# Patient Record
Sex: FEMALE | Race: WHITE | NOT HISPANIC OR LATINO | Employment: FULL TIME | URBAN - METROPOLITAN AREA
[De-identification: names, ages, dates, MRNs, and addresses within clinical notes are randomized per-mention and may not be internally consistent; named-entity substitution may affect disease eponyms.]

---

## 2023-04-19 ENCOUNTER — OFFICE VISIT (OUTPATIENT)
Dept: PRIMARY CARE | Facility: CLINIC | Age: 38
End: 2023-04-19
Payer: COMMERCIAL

## 2023-04-19 VITALS
HEIGHT: 65 IN | DIASTOLIC BLOOD PRESSURE: 82 MMHG | WEIGHT: 236 LBS | SYSTOLIC BLOOD PRESSURE: 120 MMHG | BODY MASS INDEX: 39.32 KG/M2 | TEMPERATURE: 98 F

## 2023-04-19 DIAGNOSIS — F41.9 ANXIETY AND DEPRESSION: ICD-10-CM

## 2023-04-19 DIAGNOSIS — F41.8 SITUATIONAL ANXIETY: Primary | ICD-10-CM

## 2023-04-19 DIAGNOSIS — F32.A ANXIETY AND DEPRESSION: ICD-10-CM

## 2023-04-19 PROBLEM — L03.90 CELLULITIS: Status: ACTIVE | Noted: 2023-04-19

## 2023-04-19 PROBLEM — R53.83 MALAISE AND FATIGUE: Status: ACTIVE | Noted: 2023-04-19

## 2023-04-19 PROBLEM — G93.2 PSEUDOTUMOR CEREBRI: Status: ACTIVE | Noted: 2023-04-19

## 2023-04-19 PROBLEM — R53.81 MALAISE AND FATIGUE: Status: ACTIVE | Noted: 2023-04-19

## 2023-04-19 PROBLEM — R79.89 ABNORMAL CBC: Status: ACTIVE | Noted: 2023-04-19

## 2023-04-19 PROBLEM — R73.03 PREDIABETES: Status: ACTIVE | Noted: 2023-04-19

## 2023-04-19 PROBLEM — Z97.5 IUD CONTRACEPTION: Status: ACTIVE | Noted: 2023-04-19

## 2023-04-19 PROCEDURE — 99214 OFFICE O/P EST MOD 30 MIN: CPT | Performed by: NURSE PRACTITIONER

## 2023-04-19 RX ORDER — NORETHINDRONE 0.35 MG/1
1 TABLET ORAL DAILY
COMMUNITY
Start: 2023-03-06 | End: 2023-04-19 | Stop reason: ALTCHOICE

## 2023-04-19 RX ORDER — ERYTHROMYCIN 5 MG/G
OINTMENT OPHTHALMIC
COMMUNITY
End: 2023-04-19 | Stop reason: ALTCHOICE

## 2023-04-19 RX ORDER — BUSPIRONE HYDROCHLORIDE 7.5 MG/1
7.5 TABLET ORAL 2 TIMES DAILY
Qty: 180 TABLET | Refills: 1 | Status: SHIPPED | OUTPATIENT
Start: 2023-04-19 | End: 2023-06-27 | Stop reason: SDUPTHER

## 2023-04-19 RX ORDER — ESCITALOPRAM OXALATE 20 MG/1
20 TABLET ORAL DAILY
Qty: 90 TABLET | Refills: 1 | Status: SHIPPED | OUTPATIENT
Start: 2023-04-19 | End: 2023-09-29 | Stop reason: SDUPTHER

## 2023-04-19 RX ORDER — BUSPIRONE HYDROCHLORIDE 5 MG/1
5 TABLET ORAL
COMMUNITY
End: 2023-04-19 | Stop reason: ALTCHOICE

## 2023-04-19 RX ORDER — BUPROPION HYDROCHLORIDE 150 MG/1
150 TABLET ORAL DAILY
COMMUNITY
End: 2023-04-19 | Stop reason: SDUPTHER

## 2023-04-19 RX ORDER — FLUTICASONE PROPIONATE 50 MCG
1 SPRAY, SUSPENSION (ML) NASAL 2 TIMES DAILY
COMMUNITY
Start: 2022-06-08 | End: 2023-09-29 | Stop reason: ENTERED-IN-ERROR

## 2023-04-19 RX ORDER — ESCITALOPRAM OXALATE 20 MG/1
20 TABLET ORAL DAILY
COMMUNITY
End: 2023-04-19 | Stop reason: SDUPTHER

## 2023-04-19 RX ORDER — BUPROPION HYDROCHLORIDE 150 MG/1
150 TABLET ORAL DAILY
Qty: 90 TABLET | Refills: 1 | Status: SHIPPED | OUTPATIENT
Start: 2023-04-19 | End: 2023-06-23 | Stop reason: SDUPTHER

## 2023-04-19 ASSESSMENT — PATIENT HEALTH QUESTIONNAIRE - PHQ9
SUM OF ALL RESPONSES TO PHQ9 QUESTIONS 1 AND 2: 1
2. FEELING DOWN, DEPRESSED OR HOPELESS: NOT AT ALL
1. LITTLE INTEREST OR PLEASURE IN DOING THINGS: SEVERAL DAYS

## 2023-04-19 NOTE — PROGRESS NOTES
"Subjective   Patient ID: Kristina Cortez is a 37 y.o. female who presents for Follow-up.    HPI   Takes Buspar twice daily  Wonders about a higher dose of this.  Feeling like it does not work as well as it did initially.  Taking Bupropion and Escitalopram regularly  Going through a tough time.  GYN  - due to go.    Review of Systems   All other systems reviewed and are negative.        Objective   /82   Temp 36.7 °C (98 °F)   Ht 1.651 m (5' 5\")   Wt 107 kg (236 lb)   BMI 39.27 kg/m²     Physical Exam  Vitals and nursing note reviewed.   Constitutional:       Appearance: Normal appearance. She is obese.   HENT:      Head: Normocephalic and atraumatic.      Right Ear: Tympanic membrane and ear canal normal.      Left Ear: Tympanic membrane and ear canal normal.   Neck:      Thyroid: No thyroid mass, thyromegaly or thyroid tenderness.   Cardiovascular:      Rate and Rhythm: Normal rate and regular rhythm.      Pulses: Normal pulses.      Heart sounds: Normal heart sounds.   Pulmonary:      Effort: Pulmonary effort is normal.      Breath sounds: Normal breath sounds.   Abdominal:      General: Bowel sounds are normal.      Palpations: Abdomen is soft.   Musculoskeletal:      Cervical back: Normal range of motion and neck supple.   Lymphadenopathy:      Cervical: No cervical adenopathy.   Neurological:      Mental Status: She is alert and oriented to person, place, and time. Mental status is at baseline.   Psychiatric:         Mood and Affect: Mood normal.         Behavior: Behavior normal.      Comments: Good eye contact, pleasant           Assessment/Plan   Problem List Items Addressed This Visit          Other    Anxiety and depression     Continue with current medications         Relevant Medications    escitalopram (Lexapro) 20 mg tablet    buPROPion XL (Wellbutrin XL) 150 mg 24 hr tablet    Situational anxiety - Primary     Bupropion PRN at increased dose         Relevant Medications    busPIRone " (Buspar) 7.5 mg tablet

## 2023-04-19 NOTE — PATIENT INSTRUCTIONS
Good to see you today.  Buspirone increased to 7.5 mg - you can take 1/2 to 1 to 2 per day as needed for situational anxiety  Continue with the Bupropion and Escitalopram  Let me know how you are doing on the new dose.

## 2023-04-30 PROBLEM — L03.90 CELLULITIS: Status: RESOLVED | Noted: 2023-04-19 | Resolved: 2023-04-30

## 2023-04-30 PROBLEM — F41.9 ANXIETY AND DEPRESSION: Status: ACTIVE | Noted: 2023-04-19

## 2023-04-30 PROBLEM — F32.A ANXIETY AND DEPRESSION: Status: ACTIVE | Noted: 2023-04-19

## 2023-05-12 ENCOUNTER — OFFICE VISIT (OUTPATIENT)
Dept: PRIMARY CARE | Facility: CLINIC | Age: 38
End: 2023-05-12
Payer: COMMERCIAL

## 2023-05-12 VITALS
DIASTOLIC BLOOD PRESSURE: 82 MMHG | TEMPERATURE: 97.9 F | WEIGHT: 233 LBS | SYSTOLIC BLOOD PRESSURE: 122 MMHG | BODY MASS INDEX: 38.77 KG/M2

## 2023-05-12 DIAGNOSIS — F32.A ANXIETY AND DEPRESSION: ICD-10-CM

## 2023-05-12 DIAGNOSIS — F41.9 ANXIETY AND DEPRESSION: ICD-10-CM

## 2023-05-12 DIAGNOSIS — F41.8 SITUATIONAL ANXIETY: Primary | ICD-10-CM

## 2023-05-12 PROCEDURE — 99214 OFFICE O/P EST MOD 30 MIN: CPT | Performed by: NURSE PRACTITIONER

## 2023-05-12 NOTE — PATIENT INSTRUCTIONS
Good to see you today.    FMLA forms completed.  Continue with current medications.  Call for counseling.  Let me know if you need anything.

## 2023-05-12 NOTE — PROGRESS NOTES
Subjective   Patient ID: Kristina Cortez is a 37 y.o. female who presents for Forms/questionnaires (FMLA forms that need filled out).    HPI   Living situation with spouse and new Significant Other in their home has been more overwhelming.  Went to her mom's for a break and to re-group & clear her head.  Did not work during this time due to feeling it was difficult to function, including work.  Was off work from [employer] April 24- 5/6  Off 5/7 and RTW 5/8 with no restrictions.  Has been there 15 years    Review of Systems   All other systems reviewed and are negative.        Objective   /82   Temp 36.6 °C (97.9 °F)   Wt 106 kg (233 lb)   BMI 38.77 kg/m²     Physical Exam  Vitals and nursing note reviewed.   Constitutional:       Appearance: Normal appearance.   HENT:      Head: Normocephalic and atraumatic.      Nose: Nose normal.      Mouth/Throat:      Pharynx: Oropharynx is clear.   Eyes:      Conjunctiva/sclera: Conjunctivae normal.      Pupils: Pupils are equal, round, and reactive to light.   Cardiovascular:      Rate and Rhythm: Normal rate and regular rhythm.      Pulses: Normal pulses.      Heart sounds: Normal heart sounds.   Pulmonary:      Effort: Pulmonary effort is normal.      Breath sounds: Normal breath sounds.   Musculoskeletal:      Cervical back: Normal range of motion and neck supple.   Neurological:      Mental Status: She is alert and oriented to person, place, and time. Mental status is at baseline.   Psychiatric:         Mood and Affect: Mood normal.         Behavior: Behavior normal.         Thought Content: Thought content normal.      Comments: Good eye contact, pleasant         Assessment/Plan   Problem List Items Addressed This Visit          Other    Anxiety and depression    Situational anxiety - Primary   Continue with current Rx

## 2023-05-17 ENCOUNTER — TELEPHONE (OUTPATIENT)
Dept: PRIMARY CARE | Facility: CLINIC | Age: 38
End: 2023-05-17
Payer: COMMERCIAL

## 2023-05-17 NOTE — TELEPHONE ENCOUNTER
Chiara a nurse with [employer] received patients disability paperwork and had a few questions. Any abnormal mental status since last visit, change in treatment plan or any additional dates of treament after 4/24/23    Chiara  [phone]

## 2023-06-23 ENCOUNTER — OFFICE VISIT (OUTPATIENT)
Dept: PRIMARY CARE | Facility: CLINIC | Age: 38
End: 2023-06-23
Payer: COMMERCIAL

## 2023-06-23 VITALS
TEMPERATURE: 97.6 F | WEIGHT: 235 LBS | SYSTOLIC BLOOD PRESSURE: 148 MMHG | DIASTOLIC BLOOD PRESSURE: 82 MMHG | BODY MASS INDEX: 39.11 KG/M2

## 2023-06-23 DIAGNOSIS — F41.9 ANXIETY AND DEPRESSION: Primary | ICD-10-CM

## 2023-06-23 DIAGNOSIS — F41.8 SITUATIONAL ANXIETY: ICD-10-CM

## 2023-06-23 DIAGNOSIS — F32.A ANXIETY AND DEPRESSION: Primary | ICD-10-CM

## 2023-06-23 PROCEDURE — 99214 OFFICE O/P EST MOD 30 MIN: CPT | Performed by: NURSE PRACTITIONER

## 2023-06-23 RX ORDER — BUPROPION HYDROCHLORIDE 150 MG/1
300 TABLET ORAL DAILY
Qty: 90 TABLET | Refills: 1 | Status: SHIPPED | OUTPATIENT
Start: 2023-06-23 | End: 2023-09-29 | Stop reason: SDUPTHER
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
## 2023-06-23 NOTE — PATIENT INSTRUCTIONS
Good to see you today   Increase the Bupropion to 300 mg (you can take 2 @ 150 mg once daily until gone)  Then the new Rx is one per day.  For Counseling, check out the website for  [facility] - they have a [place] location  [phone] [id number]    ZOEY Robertson  Plan to follow up in 3 months   Yes

## 2023-06-27 RX ORDER — BUSPIRONE HYDROCHLORIDE 15 MG/1
TABLET ORAL
Qty: 270 TABLET | Refills: 1 | Status: SHIPPED | OUTPATIENT
Start: 2023-06-27 | End: 2023-09-29 | Stop reason: SDUPTHER

## 2023-09-20 ENCOUNTER — APPOINTMENT (OUTPATIENT)
Dept: PRIMARY CARE | Facility: CLINIC | Age: 38
End: 2023-09-20
Payer: COMMERCIAL

## 2023-09-29 ENCOUNTER — OFFICE VISIT (OUTPATIENT)
Dept: PRIMARY CARE | Facility: CLINIC | Age: 38
End: 2023-09-29
Payer: COMMERCIAL

## 2023-09-29 VITALS
TEMPERATURE: 97.9 F | DIASTOLIC BLOOD PRESSURE: 84 MMHG | WEIGHT: 231 LBS | BODY MASS INDEX: 38.44 KG/M2 | SYSTOLIC BLOOD PRESSURE: 132 MMHG

## 2023-09-29 DIAGNOSIS — F32.A ANXIETY AND DEPRESSION: ICD-10-CM

## 2023-09-29 DIAGNOSIS — F41.9 ANXIETY AND DEPRESSION: ICD-10-CM

## 2023-09-29 DIAGNOSIS — F41.8 SITUATIONAL ANXIETY: ICD-10-CM

## 2023-09-29 PROCEDURE — 99214 OFFICE O/P EST MOD 30 MIN: CPT | Performed by: NURSE PRACTITIONER

## 2023-09-29 RX ORDER — BUSPIRONE HYDROCHLORIDE 15 MG/1
TABLET ORAL
Qty: 270 TABLET | Refills: 1 | Status: SHIPPED | OUTPATIENT
Start: 2023-09-29 | End: 2024-05-17 | Stop reason: SDUPTHER

## 2023-09-29 RX ORDER — BUPROPION HYDROCHLORIDE 300 MG/1
300 TABLET ORAL DAILY
Qty: 90 TABLET | Refills: 1 | Status: SHIPPED | OUTPATIENT
Start: 2023-09-29 | End: 2024-05-17 | Stop reason: SDUPTHER

## 2023-09-29 RX ORDER — ESCITALOPRAM OXALATE 20 MG/1
20 TABLET ORAL DAILY
Qty: 90 TABLET | Refills: 1 | Status: SHIPPED | OUTPATIENT
Start: 2023-09-29 | End: 2024-05-17 | Stop reason: SDUPTHER

## 2023-09-29 NOTE — PATIENT INSTRUCTIONS
Good to see you today.  Refill of medication.   Get back to counseling when you can.  Work on a better routine which gives you a little time to take care of yourself too.  Plan to follow up in 6 months.
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
## 2024-05-17 ENCOUNTER — OFFICE VISIT (OUTPATIENT)
Dept: PRIMARY CARE | Facility: CLINIC | Age: 39
End: 2024-05-17
Payer: MEDICAID

## 2024-05-17 VITALS
TEMPERATURE: 97.9 F | SYSTOLIC BLOOD PRESSURE: 144 MMHG | HEIGHT: 65 IN | BODY MASS INDEX: 32.15 KG/M2 | WEIGHT: 193 LBS | DIASTOLIC BLOOD PRESSURE: 88 MMHG

## 2024-05-17 DIAGNOSIS — F41.8 SITUATIONAL ANXIETY: ICD-10-CM

## 2024-05-17 DIAGNOSIS — F32.A ANXIETY AND DEPRESSION: ICD-10-CM

## 2024-05-17 DIAGNOSIS — F41.9 ANXIETY AND DEPRESSION: ICD-10-CM

## 2024-05-17 PROCEDURE — 4004F PT TOBACCO SCREEN RCVD TLK: CPT | Performed by: NURSE PRACTITIONER

## 2024-05-17 PROCEDURE — 99214 OFFICE O/P EST MOD 30 MIN: CPT | Performed by: NURSE PRACTITIONER

## 2024-05-17 RX ORDER — BUPROPION HYDROCHLORIDE 300 MG/1
300 TABLET ORAL DAILY
Qty: 90 TABLET | Refills: 3 | Status: SHIPPED | OUTPATIENT
Start: 2024-05-17 | End: 2024-05-20 | Stop reason: SDUPTHER

## 2024-05-17 RX ORDER — ESCITALOPRAM OXALATE 20 MG/1
20 TABLET ORAL DAILY
Qty: 90 TABLET | Refills: 3 | Status: SHIPPED | OUTPATIENT
Start: 2024-05-17 | End: 2024-05-20 | Stop reason: SDUPTHER

## 2024-05-17 RX ORDER — BUSPIRONE HYDROCHLORIDE 15 MG/1
TABLET ORAL
Qty: 270 TABLET | Refills: 3 | Status: SHIPPED | OUTPATIENT
Start: 2024-05-17 | End: 2024-05-20 | Stop reason: SDUPTHER

## 2024-05-17 ASSESSMENT — PATIENT HEALTH QUESTIONNAIRE - PHQ9
1. LITTLE INTEREST OR PLEASURE IN DOING THINGS: NOT AT ALL
SUM OF ALL RESPONSES TO PHQ9 QUESTIONS 1 AND 2: 0
2. FEELING DOWN, DEPRESSED OR HOPELESS: NOT AT ALL

## 2024-05-17 NOTE — PROGRESS NOTES
"Subjective   Patient ID: Kristina Cortez is a 38 y.o. female who presents for Follow-up (Med refill).    HPI   Has been at Mom's for a year   Spending days trying to find job.  Taking care of parents.  Dad is retiring.    Walking for activity.  No longer stress eating  Smoking 5 per day  Takes Benadryl to sleep  Dtgr stresses her out.    Review of Systems   Psychiatric/Behavioral:  Positive for dysphoric mood and sleep disturbance. The patient is nervous/anxious.    All other systems reviewed and are negative.      Objective   /88   Temp 36.6 °C (97.9 °F)   Ht 1.651 m (5' 5\")   Wt 87.5 kg (193 lb)   BMI 32.12 kg/m²     Physical Exam  Vitals and nursing note reviewed.   Constitutional:       Appearance: Normal appearance.   HENT:      Head: Normocephalic and atraumatic.      Right Ear: Tympanic membrane, ear canal and external ear normal.      Left Ear: Tympanic membrane, ear canal and external ear normal.      Mouth/Throat:      Pharynx: Oropharynx is clear.   Neck:      Thyroid: No thyroid mass, thyromegaly or thyroid tenderness.   Cardiovascular:      Rate and Rhythm: Normal rate and regular rhythm.      Pulses: Normal pulses.      Heart sounds: Normal heart sounds.   Pulmonary:      Effort: Pulmonary effort is normal.      Breath sounds: Normal breath sounds.   Abdominal:      General: Bowel sounds are normal.      Palpations: Abdomen is soft.   Neurological:      Mental Status: She is alert and oriented to person, place, and time.   Psychiatric:         Mood and Affect: Mood normal.         Behavior: Behavior normal.         Thought Content: Thought content normal.         Judgment: Judgment normal.         Assessment/Plan   Problem List Items Addressed This Visit             ICD-10-CM    Anxiety and depression F41.9, F32.A     Continue with current medication. Again encouraged counseling.          Relevant Medications    buPROPion XL (Wellbutrin XL) 300 mg 24 hr tablet    escitalopram (Lexapro) 20 mg " tablet    Situational anxiety F41.8     Buspirone PRN         Relevant Medications    busPIRone (Buspar) 15 mg tablet

## 2024-05-20 RX ORDER — BUPROPION HYDROCHLORIDE 300 MG/1
300 TABLET ORAL DAILY
Qty: 30 TABLET | Refills: 11 | Status: SHIPPED | OUTPATIENT
Start: 2024-05-20

## 2024-05-20 RX ORDER — BUSPIRONE HYDROCHLORIDE 15 MG/1
TABLET ORAL
Qty: 60 TABLET | Refills: 11 | Status: SHIPPED | OUTPATIENT
Start: 2024-05-20

## 2024-05-20 RX ORDER — ESCITALOPRAM OXALATE 20 MG/1
20 TABLET ORAL DAILY
Qty: 30 TABLET | Refills: 11 | Status: SHIPPED | OUTPATIENT
Start: 2024-05-20

## 2024-06-11 ASSESSMENT — ENCOUNTER SYMPTOMS
DYSPHORIC MOOD: 1
NERVOUS/ANXIOUS: 1
SLEEP DISTURBANCE: 1

## 2025-03-11 ENCOUNTER — APPOINTMENT (OUTPATIENT)
Dept: NEUROLOGY | Facility: HOSPITAL | Age: 40
End: 2025-03-11
Payer: COMMERCIAL

## 2025-05-22 ENCOUNTER — OFFICE VISIT (OUTPATIENT)
Dept: URGENT CARE | Age: 40
End: 2025-05-22
Payer: MEDICAID

## 2025-05-22 VITALS
HEART RATE: 95 BPM | SYSTOLIC BLOOD PRESSURE: 121 MMHG | DIASTOLIC BLOOD PRESSURE: 89 MMHG | OXYGEN SATURATION: 97 % | TEMPERATURE: 98.2 F

## 2025-05-22 DIAGNOSIS — R30.0 DYSURIA: ICD-10-CM

## 2025-05-22 DIAGNOSIS — N30.01 ACUTE CYSTITIS WITH HEMATURIA: Primary | ICD-10-CM

## 2025-05-22 LAB
POC APPEARANCE, URINE: ABNORMAL
POC BILIRUBIN, URINE: ABNORMAL
POC BLOOD, URINE: ABNORMAL
POC COLOR, URINE: ABNORMAL
POC GLUCOSE, URINE: NEGATIVE MG/DL
POC KETONES, URINE: NEGATIVE MG/DL
POC LEUKOCYTES, URINE: ABNORMAL
POC NITRITE,URINE: NEGATIVE
POC PH, URINE: 6 PH
POC PROTEIN, URINE: ABNORMAL MG/DL
POC SPECIFIC GRAVITY, URINE: >=1.03
POC UROBILINOGEN, URINE: 0.2 EU/DL
PREGNANCY TEST URINE, POC: NEGATIVE

## 2025-05-22 RX ORDER — NITROFURANTOIN 25; 75 MG/1; MG/1
100 CAPSULE ORAL 2 TIMES DAILY
Qty: 14 CAPSULE | Refills: 0 | Status: SHIPPED | OUTPATIENT
Start: 2025-05-22 | End: 2025-05-29

## 2025-05-22 ASSESSMENT — ENCOUNTER SYMPTOMS
BACK PAIN: 0
FREQUENCY: 1
DIARRHEA: 0
FLANK PAIN: 0
FEVER: 0
CHILLS: 0
ABDOMINAL DISTENTION: 0
VOMITING: 0
DYSURIA: 1
NAUSEA: 0
HEMATURIA: 0
SHORTNESS OF BREATH: 0

## 2025-05-22 NOTE — PATIENT INSTRUCTIONS
If you develop nausea, vomiting, back pain and fever go to ER    Drink plenty of fluids    Results will be in mychart from culture  We will call if any change in antibiotic is needed    Follow up with pcp.

## 2025-05-22 NOTE — PROGRESS NOTES
Subjective   Patient ID: Kristina Cortez is a 39 y.o. female. They present today with a chief complaint of Difficulty Urinating (X 1 week ).    History of Present Illness  Patient presents for one week of urinary urgency, frequency, and burning with urination. Denies hematuria. Denies back pain, flank pain. Denies fever, chills, sweats. Denies n/v/d. Denies chest pain, sob.         Difficulty Urinating  Associated symptoms: no fever, no flank pain, no nausea, no vaginal discharge and no vomiting        Past Medical History  Allergies as of 05/22/2025 - Reviewed 05/22/2025   Allergen Reaction Noted    Sulfa (sulfonamide antibiotics) Unknown 12/10/2017    Sulfamethoxazole Unknown 04/19/2023    Sulfa dyne Rash 10/05/2012       Prescriptions Prior to Admission[1]     Medical History[2]    Surgical History[3]     reports that she has been smoking cigarettes. She has never used smokeless tobacco.    Review of Systems  Review of Systems   Constitutional:  Negative for chills and fever.   Respiratory:  Negative for shortness of breath.    Cardiovascular:  Negative for chest pain.   Gastrointestinal:  Negative for abdominal distention, diarrhea, nausea and vomiting.   Genitourinary:  Positive for dysuria, frequency and urgency. Negative for flank pain, hematuria, vaginal bleeding, vaginal discharge and vaginal pain.   Musculoskeletal:  Negative for back pain.                                  Objective    Vitals:    05/22/25 1620   BP: 121/89   Pulse: 95   Temp: 36.8 °C (98.2 °F)   SpO2: 97%     Patient's last menstrual period was 05/08/2025 (approximate).    Physical Exam  Constitutional:       General: She is not in acute distress.     Appearance: She is not toxic-appearing.   HENT:      Head: Normocephalic and atraumatic.   Pulmonary:      Effort: Pulmonary effort is normal. No respiratory distress.      Breath sounds: Normal breath sounds. No wheezing.   Abdominal:      General: Abdomen is flat.      Palpations: Abdomen  is soft.      Tenderness: There is no abdominal tenderness. There is no right CVA tenderness, left CVA tenderness, guarding or rebound.   Musculoskeletal:      Cervical back: Normal range of motion.   Neurological:      Mental Status: She is alert.         Procedures    Point of Care Test & Imaging Results from this visit  Results for orders placed or performed in visit on 05/22/25   POCT UA Automated manually resulted   Result Value Ref Range    POC Color, Urine Red-brown (A) Straw, Yellow, Light-Yellow    POC Appearance, Urine Turbid (A) Clear    POC Glucose, Urine NEGATIVE NEGATIVE mg/dl    POC Bilirubin, Urine SMALL (1+) (A) NEGATIVE    POC Ketones, Urine NEGATIVE NEGATIVE mg/dl    POC Specific Gravity, Urine >=1.030 1.005 - 1.035    POC Blood, Urine LARGE (3+) (A) NEGATIVE    POC PH, Urine 6.0 No Reference Range Established PH    POC Protein, Urine TRACE (A) NEGATIVE mg/dl    POC Urobilinogen, Urine 0.2 0.2, 1.0 EU/DL    Poc Nitrite, Urine NEGATIVE NEGATIVE    POC Leukocytes, Urine MODERATE (2+) (A) NEGATIVE   POCT pregnancy, urine manually resulted   Result Value Ref Range    Preg Test, Ur Negative Negative      Imaging  No results found.    Cardiology, Vascular, and Other Imaging  No other imaging results found for the past 2 days      Diagnostic study results (if any) were reviewed by Kristina Huffman PA-C.    Assessment/Plan   Allergies, medications, history, and pertinent labs/EKGs/Imaging reviewed by Kristina Huffman PA-C.     Medical Decision Making  MDM- History, examination, and urine dipstick, positive leuks and blood, result are consistent with UTI without evidence of pyelonephritis or sepsis. Patient will be given antibiotic therapy and cultures pending. Supportive measures. Return to clinic or present to ED if symptoms change or worsen. Otherwise follow with PCP. Patient verbalized understanding and agrees with plan.       Orders and Diagnoses  Diagnoses and all orders for this visit:  Acute  cystitis with hematuria  -     nitrofurantoin, macrocrystal-monohydrate, (Macrobid) 100 mg capsule; Take 1 capsule (100 mg) by mouth 2 times a day for 7 days.  Dysuria  -     POCT UA Automated manually resulted  -     POCT pregnancy, urine manually resulted  -     Urine Culture      Medical Admin Record      Patient disposition: Home    Electronically signed by Kristina Huffman PA-C  4:25 PM           [1] (Not in a hospital admission)   [2]   Past Medical History:  Diagnosis Date    Other specified health status     No pertinent past medical history   [3]   Past Surgical History:  Procedure Laterality Date    OTHER SURGICAL HISTORY  06/19/2020    Tonsillectomy

## 2025-05-23 LAB — BACTERIA UR CULT: NORMAL

## 2025-05-30 DIAGNOSIS — F41.9 ANXIETY AND DEPRESSION: ICD-10-CM

## 2025-05-30 DIAGNOSIS — F32.A ANXIETY AND DEPRESSION: ICD-10-CM

## 2025-06-03 RX ORDER — BUPROPION HYDROCHLORIDE 300 MG/1
300 TABLET ORAL DAILY
Qty: 30 TABLET | Refills: 0 | Status: SHIPPED | OUTPATIENT
Start: 2025-06-03

## 2025-06-11 DIAGNOSIS — F32.A ANXIETY AND DEPRESSION: ICD-10-CM

## 2025-06-11 DIAGNOSIS — F41.8 SITUATIONAL ANXIETY: ICD-10-CM

## 2025-06-11 DIAGNOSIS — F41.9 ANXIETY AND DEPRESSION: ICD-10-CM

## 2025-06-11 RX ORDER — BUSPIRONE HYDROCHLORIDE 15 MG/1
15 TABLET ORAL 3 TIMES DAILY PRN
Qty: 270 TABLET | Refills: 0 | Status: SHIPPED | OUTPATIENT
Start: 2025-06-11

## 2025-06-11 RX ORDER — BUPROPION HYDROCHLORIDE 300 MG/1
300 TABLET ORAL DAILY
Qty: 90 TABLET | Refills: 0 | Status: SHIPPED | OUTPATIENT
Start: 2025-06-11

## 2025-06-11 RX ORDER — NORETHINDRONE 0.35 MG/1
1 TABLET ORAL
COMMUNITY
Start: 2025-04-02

## 2025-06-11 RX ORDER — ESCITALOPRAM OXALATE 20 MG/1
20 TABLET ORAL DAILY
Qty: 90 TABLET | Refills: 0 | Status: SHIPPED | OUTPATIENT
Start: 2025-06-11

## 2025-06-25 ENCOUNTER — APPOINTMENT (OUTPATIENT)
Dept: PRIMARY CARE | Facility: CLINIC | Age: 40
End: 2025-06-25
Payer: MEDICAID

## 2025-06-25 VITALS
BODY MASS INDEX: 31.65 KG/M2 | WEIGHT: 190 LBS | TEMPERATURE: 97.7 F | DIASTOLIC BLOOD PRESSURE: 82 MMHG | SYSTOLIC BLOOD PRESSURE: 122 MMHG | HEIGHT: 65 IN

## 2025-06-25 DIAGNOSIS — Z00.00 ROUTINE GENERAL MEDICAL EXAMINATION AT A HEALTH CARE FACILITY: Primary | ICD-10-CM

## 2025-06-25 DIAGNOSIS — F41.9 ANXIETY AND DEPRESSION: ICD-10-CM

## 2025-06-25 DIAGNOSIS — Z13.6 SCREENING FOR HEART DISEASE: ICD-10-CM

## 2025-06-25 DIAGNOSIS — F41.8 SITUATIONAL ANXIETY: ICD-10-CM

## 2025-06-25 DIAGNOSIS — E55.9 VITAMIN D DEFICIENCY: ICD-10-CM

## 2025-06-25 DIAGNOSIS — R53.83 FATIGUE, UNSPECIFIED TYPE: ICD-10-CM

## 2025-06-25 DIAGNOSIS — F32.A ANXIETY AND DEPRESSION: ICD-10-CM

## 2025-06-25 DIAGNOSIS — Z13.1 SCREENING FOR DIABETES MELLITUS: ICD-10-CM

## 2025-06-25 PROCEDURE — 99395 PREV VISIT EST AGE 18-39: CPT | Performed by: NURSE PRACTITIONER

## 2025-06-25 PROCEDURE — 93000 ELECTROCARDIOGRAM COMPLETE: CPT | Performed by: NURSE PRACTITIONER

## 2025-06-25 PROCEDURE — 3008F BODY MASS INDEX DOCD: CPT | Performed by: NURSE PRACTITIONER

## 2025-06-25 PROCEDURE — 99213 OFFICE O/P EST LOW 20 MIN: CPT | Performed by: NURSE PRACTITIONER

## 2025-06-25 RX ORDER — ESCITALOPRAM OXALATE 20 MG/1
20 TABLET ORAL DAILY
Qty: 30 TABLET | Refills: 11 | Status: SHIPPED | OUTPATIENT
Start: 2025-06-25

## 2025-06-25 RX ORDER — BUPROPION HYDROCHLORIDE 300 MG/1
300 TABLET ORAL DAILY
Qty: 30 TABLET | Refills: 11 | Status: SHIPPED | OUTPATIENT
Start: 2025-06-25

## 2025-06-25 RX ORDER — BUSPIRONE HYDROCHLORIDE 15 MG/1
15 TABLET ORAL 3 TIMES DAILY PRN
Qty: 90 TABLET | Refills: 11 | Status: SHIPPED | OUTPATIENT
Start: 2025-06-25

## 2025-06-25 ASSESSMENT — COLUMBIA-SUICIDE SEVERITY RATING SCALE - C-SSRS
6. HAVE YOU EVER DONE ANYTHING, STARTED TO DO ANYTHING, OR PREPARED TO DO ANYTHING TO END YOUR LIFE?: NO
2. HAVE YOU ACTUALLY HAD ANY THOUGHTS OF KILLING YOURSELF?: NO
1. IN THE PAST MONTH, HAVE YOU WISHED YOU WERE DEAD OR WISHED YOU COULD GO TO SLEEP AND NOT WAKE UP?: NO

## 2025-06-25 NOTE — ASSESSMENT & PLAN NOTE
Orders:    escitalopram (Lexapro) 20 mg tablet; Take 1 tablet (20 mg) by mouth once daily.    buPROPion XL (Wellbutrin XL) 300 mg 24 hr tablet; Take 1 tablet (300 mg) by mouth once daily.

## 2025-06-25 NOTE — PROGRESS NOTES
Subjective   Patient ID: Kristina Cortez is a 39 y.o. female who presents for Annual Exam (CPX).  History of Present Illness  Kristina Cortez is a 39 year old female with anxiety and depression who presents for a CPX and routine follow-up visit.    Her stress levels have improved, but she continues to worry about her parents, particularly her mother who had most of her foot amputated due to a bone infection related to diabetes. Her mother recently received a prosthetic device for her foot.    She continues to take Lexapro 20 mg daily, buspirone twice daily (sometimes three times), and bupropion 300 mg daily. Bupropion helps her focus and does not cause anxiety. She has a copper IUD in place since her son was born, which is effective for four more years. She experiences daily bleeding and cannot use hormonal birth control.  Has GYN    She quit smoking cold turkey and has a history of rare alcohol intake, no drug use, and previously vaped. She is currently  from her  and lives with her mother. She has two children, one of whom was recently diagnosed with autism and attends a regular school with additional services.    Her family history includes high cholesterol, high blood pressure, and diabetes in her mother, and high blood pressure in her father. Both parents have undergone heart surgery. Her maternal grandmother also had diabetes.    She engages in physical activity by walking and playing with her son, and has reduced her caffeine intake to one Mountain Dew per day to avoid headaches. She aims to lose 30-40 pounds to reach her ideal weight of 160 pounds.  Last thee ~ 2011    No skin changes, moles, freckles, rashes, heartburn, acid reflux, constipation, diarrhea, thyroid issues, recent pneumonia, flu, snoring, or breathing issues since quitting smoking.      Review of Systems   Constitutional:  Positive for activity change and unexpected weight change.   Respiratory:  Negative for apnea.   "  Cardiovascular:  Negative for chest pain, palpitations and leg swelling.   Endocrine: Negative for cold intolerance, heat intolerance, polydipsia, polyphagia and polyuria.   Psychiatric/Behavioral:  Positive for dysphoric mood. Negative for sleep disturbance. The patient is nervous/anxious.        Physical Exam  Vitals and nursing note reviewed.   Constitutional:       Appearance: She is obese.   HENT:      Head: Normocephalic and atraumatic.      Right Ear: Tympanic membrane, ear canal and external ear normal.      Left Ear: Tympanic membrane, ear canal and external ear normal.      Nose: Nose normal.      Mouth/Throat:      Mouth: Mucous membranes are moist.      Pharynx: Oropharynx is clear.   Eyes:      Extraocular Movements: Extraocular movements intact.      Conjunctiva/sclera: Conjunctivae normal.      Pupils: Pupils are equal, round, and reactive to light.   Neck:      Thyroid: No thyroid mass, thyromegaly or thyroid tenderness.   Cardiovascular:      Rate and Rhythm: Normal rate and regular rhythm.      Pulses: Normal pulses.      Heart sounds: Normal heart sounds.   Pulmonary:      Effort: Pulmonary effort is normal.      Breath sounds: Normal breath sounds.   Abdominal:      General: Bowel sounds are normal.      Palpations: Abdomen is soft.   Genitourinary:     Comments: Deferred  Musculoskeletal:         General: Normal range of motion.      Cervical back: Normal range of motion and neck supple.   Skin:     General: Skin is warm.      Capillary Refill: Capillary refill takes 2 to 3 seconds.   Neurological:      Mental Status: She is alert and oriented to person, place, and time. Mental status is at baseline.   Psychiatric:         Behavior: Behavior normal.         Thought Content: Thought content normal.         Judgment: Judgment normal.      Comments: Good eye contact, pleasant  Mildly anxious         Results         Objective     /82   Temp 36.5 °C (97.7 °F)   Ht 1.638 m (5' 4.5\")   Wt " 86.2 kg (190 lb)   BMI 32.11 kg/m²      Assessment & Plan  Generalized Anxiety Disorder  She is currently taking Lexapro 20 mg daily, buspirone twice daily (sometimes three times), and bupropion 300 mg daily. Bupropion aids focus and does not exacerbate anxiety, despite its potential to do so.  - Continue Lexapro 20 mg daily  - Continue buspirone twice daily  - Continue bupropion 300 mg daily  - Refill medications for one year    Contraception Management  She has a copper IUD in place since her son was born, effective for four more years. She experiences daily bleeding but cannot use hormonal contraception due to contraindications.    Family History of Cardiovascular Disease and Diabetes  She has a significant family history of cardiovascular disease and diabetes, with both parents having undergone bypass surgery and having hypertension. Her mother and maternal grandmother have diabetes. Monitoring cholesterol and blood glucose levels is emphasized due to this family history.  - Order fasting blood work to check cholesterol and blood glucose levels  - Provide a list of [facility] locations for blood work  - Instruct her to fast for 8 hours before blood work, allowing water and black coffee    General Health Maintenance  She is a former smoker with rare alcohol intake, physically active with her son, and has reduced caffeine intake. She is due for a Pap test and is not yet eligible for mammograms or colon cancer screening. A full colonoscopy is advised when eligible due to smoking history. An EKG is needed to update cardiac status.  - Schedule Pap test with a nurse practitioner  - Perform EKG to update cardiac status  - Advise full colonoscopy at age 46 due to smoking history  - Monitor for eligibility for mammograms at age 41    Recording duration: 16 minutes    Assessment & Plan  Routine general medical examination at a health care facility         Screening for heart disease    Orders:    ECG 12 lead  (Clinic Performed)    Lipid Panel; Future    Screening for diabetes mellitus    Orders:    Comprehensive Metabolic Panel; Future    Hemoglobin A1C; Future    Fatigue, unspecified type    Orders:    CBC; Future    TSH with reflex to Free T4 if abnormal; Future    Vitamin D deficiency    Orders:    Vitamin D 25-Hydroxy,Total (for eval of Vitamin D levels); Future    Anxiety and depression    Orders:    escitalopram (Lexapro) 20 mg tablet; Take 1 tablet (20 mg) by mouth once daily.    buPROPion XL (Wellbutrin XL) 300 mg 24 hr tablet; Take 1 tablet (300 mg) by mouth once daily.    Situational anxiety    Orders:    busPIRone (Buspar) 15 mg tablet; Take 1 tablet (15 mg) by mouth 3 times a day as needed (situational anxiety).         IHSAN Najera-CNP     This medical note was created with the assistance of artificial intelligence (AI) for documentation purposes. The content has been reviewed and confirmed by the healthcare provider for accuracy and completeness. Patient consented to the use of audio recording and use of AI during their visit.

## 2025-06-25 NOTE — ASSESSMENT & PLAN NOTE
Orders:    busPIRone (Buspar) 15 mg tablet; Take 1 tablet (15 mg) by mouth 3 times a day as needed (situational anxiety).

## 2025-06-25 NOTE — PATIENT INSTRUCTIONS
VISIT SUMMARY:  During your visit, we discussed your ongoing management of anxiety and depression, your current contraception method, and your family history of cardiovascular disease and diabetes. We also reviewed your general health maintenance and made plans for necessary tests and screenings.    YOUR PLAN:  -GENERALIZED ANXIETY DISORDER: Generalized Anxiety Disorder is a condition characterized by excessive, uncontrollable worry about various aspects of life. You are currently taking Lexapro 20 mg daily, buspirone twice daily (sometimes three times), and bupropion 300 mg daily. These medications will be continued, and your prescriptions have been refilled for one year.    -CONTRACEPTION MANAGEMENT: You have a copper IUD in place, which is effective for contraception for four more years. You experience daily bleeding but cannot use hormonal contraception due to contraindications. No changes are needed at this time.  Follow up with GYN for PAP:  [phone]    -FAMILY HISTORY OF CARDIOVASCULAR DISEASE AND DIABETES: Given your significant family history of cardiovascular disease and diabetes, it is important to monitor your cholesterol and blood glucose levels. We have ordered fasting blood work to check these levels. Please fast for 8 hours before the blood work, but you may drink water and black coffee. A list of [facility] locations has been provided for your convenience.    -GENERAL HEALTH MAINTENANCE: We discussed your general health maintenance, including your status as a former smoker, your physical activity, and reduced caffeine intake. You are due for a Pap test, and we will schedule this with a nurse practitioner. An EKG will be performed to update your cardiac status. A full colonoscopy is advised at age 46 due to your smoking history, and we will monitor for eligibility for mammograms at age 40.    INSTRUCTIONS:  Please schedule your Pap test with the nurse practitioner and complete the EKG as  discussed. For your fasting blood work, remember to fast for 8 hours beforehand, allowing only water and black coffee. A list of [facility] locations has been provided for your convenience. We will also plan for a full colonoscopy at age 46 and monitor for mammogram eligibility at age 41.

## 2025-06-29 ENCOUNTER — OFFICE VISIT (OUTPATIENT)
Dept: URGENT CARE | Age: 40
End: 2025-06-29
Payer: MEDICAID

## 2025-06-29 VITALS
OXYGEN SATURATION: 97 % | SYSTOLIC BLOOD PRESSURE: 124 MMHG | TEMPERATURE: 98 F | RESPIRATION RATE: 16 BRPM | DIASTOLIC BLOOD PRESSURE: 85 MMHG | HEART RATE: 96 BPM

## 2025-06-29 DIAGNOSIS — N30.00 ACUTE CYSTITIS WITHOUT HEMATURIA: Primary | ICD-10-CM

## 2025-06-29 DIAGNOSIS — R30.0 DYSURIA: ICD-10-CM

## 2025-06-29 LAB
POC APPEARANCE, URINE: ABNORMAL
POC BILIRUBIN, URINE: NEGATIVE
POC BLOOD, URINE: NEGATIVE
POC COLOR, URINE: YELLOW
POC GLUCOSE, URINE: NEGATIVE MG/DL
POC KETONES, URINE: NEGATIVE MG/DL
POC LEUKOCYTES, URINE: ABNORMAL
POC NITRITE,URINE: NEGATIVE
POC PH, URINE: 6 PH
POC PROTEIN, URINE: NEGATIVE MG/DL
POC SPECIFIC GRAVITY, URINE: 1.02
POC UROBILINOGEN, URINE: 0.2 EU/DL
PREGNANCY TEST URINE, POC: NEGATIVE

## 2025-06-29 PROCEDURE — 81003 URINALYSIS AUTO W/O SCOPE: CPT | Performed by: PHYSICIAN ASSISTANT

## 2025-06-29 PROCEDURE — 99214 OFFICE O/P EST MOD 30 MIN: CPT | Performed by: PHYSICIAN ASSISTANT

## 2025-06-29 PROCEDURE — 81025 URINE PREGNANCY TEST: CPT | Performed by: PHYSICIAN ASSISTANT

## 2025-06-29 RX ORDER — NITROFURANTOIN 25; 75 MG/1; MG/1
100 CAPSULE ORAL 2 TIMES DAILY
Qty: 14 CAPSULE | Refills: 0 | Status: SHIPPED | OUTPATIENT
Start: 2025-06-29 | End: 2025-07-06

## 2025-06-29 ASSESSMENT — ENCOUNTER SYMPTOMS
BACK PAIN: 1
CHILLS: 0
NAUSEA: 0
FEVER: 0
DYSURIA: 1
FLANK PAIN: 0
ABDOMINAL PAIN: 1
FREQUENCY: 1
VOMITING: 0
HEMATURIA: 0

## 2025-06-29 NOTE — PROGRESS NOTES
Subjective   Patient ID: Kristina Cortez is a 39 y.o. female. They present today with a chief complaint of UTI (Abdominal and back pain, burning when urinating for 2 days.).    History of Present Illness  Patient presents today for evaluation of possible urinary tract infection.  She reports she started last night with suprapubic discomfort.  She has had dysuria, increased urinary frequency and urgency as well as some back pain.  She does have a history of urinary tract infections.  She denies any fevers, chills, nausea, vomiting, vaginal discharge, vaginal itching or burning, hematuria.  Patient uses ParaGard IUD.      UTI  Associated symptoms: abdominal pain    Associated symptoms: no fever, no nausea and no vomiting        Past Medical History  Allergies as of 06/29/2025 - Reviewed 06/29/2025   Allergen Reaction Noted    Sulfa (sulfonamide antibiotics) Unknown 12/10/2017    Sulfamethoxazole Unknown 04/19/2023    Sulfa dyne Rash 10/05/2012       Prescriptions Prior to Admission[1]     Medical History[2]    Surgical History[3]     reports that she has quit smoking. Her smoking use included cigarettes. She started smoking about 2 months ago. She has a 0.1 pack-year smoking history. She has been exposed to tobacco smoke. She has never used smokeless tobacco. She reports current alcohol use. She reports that she does not use drugs.    Review of Systems  Review of Systems   Constitutional:  Negative for chills and fever.   Gastrointestinal:  Positive for abdominal pain. Negative for nausea and vomiting.   Genitourinary:  Positive for dysuria, frequency and urgency. Negative for flank pain and hematuria.   Musculoskeletal:  Positive for back pain.                                  Objective    Vitals:    06/29/25 1102   BP: 124/85   Pulse: 96   Resp: 16   Temp: 36.7 °C (98 °F)   SpO2: 97%     Patient's last menstrual period was 05/30/2025 (approximate).    Physical Exam  Vitals and nursing note reviewed.    Constitutional:       Appearance: Normal appearance.   Cardiovascular:      Rate and Rhythm: Normal rate and regular rhythm.   Pulmonary:      Effort: Pulmonary effort is normal.      Breath sounds: Normal breath sounds.   Abdominal:      General: Abdomen is flat. Bowel sounds are normal.      Palpations: Abdomen is soft.      Tenderness: There is abdominal tenderness in the suprapubic area. There is no right CVA tenderness, left CVA tenderness, guarding or rebound.   Neurological:      Mental Status: She is alert.         Procedures    Point of Care Test & Imaging Results from this visit  Results for orders placed or performed in visit on 06/29/25   POCT UA Automated manually resulted   Result Value Ref Range    POC Color, Urine Yellow Straw, Yellow, Light-Yellow    POC Appearance, Urine Cloudy (A) Clear    POC Glucose, Urine NEGATIVE NEGATIVE mg/dl    POC Bilirubin, Urine NEGATIVE NEGATIVE    POC Ketones, Urine NEGATIVE NEGATIVE mg/dl    POC Specific Gravity, Urine 1.020 1.005 - 1.035    POC Blood, Urine NEGATIVE NEGATIVE    POC PH, Urine 6.0 No Reference Range Established PH    POC Protein, Urine NEGATIVE NEGATIVE mg/dl    POC Urobilinogen, Urine 0.2 0.2, 1.0 EU/DL    Poc Nitrite, Urine NEGATIVE NEGATIVE    POC Leukocytes, Urine MODERATE (2+) (A) NEGATIVE   POCT pregnancy, urine manually resulted   Result Value Ref Range    Preg Test, Ur Negative Negative      Imaging  No results found.    Cardiology, Vascular, and Other Imaging  No other imaging results found for the past 2 days      Diagnostic study results (if any) were reviewed by Jenifer Mchugh PA-C.    Assessment/Plan   Allergies, medications, history, and pertinent labs/EKGs/Imaging reviewed by Jenifer Mchugh PA-C.     Medical Decision Making  MDM- History, examination, and urine dipstick result are consistent with uncomplicated UTI without evidence of pyelonephritis or sepsis. Patient will be given antibiotic therapy and cultures pending. Supportive  measures. Return to clinic or present to ED if symptoms change or worsen. Otherwise follow with PCP. Patient verbalized understanding and agrees with plan.      Orders and Diagnoses  Diagnoses and all orders for this visit:  Acute cystitis without hematuria  -     nitrofurantoin, macrocrystal-monohydrate, (Macrobid) 100 mg capsule; Take 1 capsule (100 mg) by mouth 2 times a day for 7 days.  -     Urine Culture  Dysuria  -     POCT UA Automated manually resulted  -     POCT pregnancy, urine manually resulted      Medical Admin Record      Patient disposition: Home    Electronically signed by Jenifer Mchugh PA-C  11:39 AM           [1] (Not in a hospital admission)   [2]   Past Medical History:  Diagnosis Date    Other specified health status     No pertinent past medical history   [3]   Past Surgical History:  Procedure Laterality Date    OTHER SURGICAL HISTORY  06/19/2020    Tonsillectomy    TONSILLECTOMY

## 2025-07-01 LAB — BACTERIA UR CULT: NORMAL

## 2025-07-09 PROBLEM — E55.9 VITAMIN D DEFICIENCY: Status: ACTIVE | Noted: 2025-07-09

## 2025-07-09 PROBLEM — R79.89 ABNORMAL CBC: Status: RESOLVED | Noted: 2023-04-19 | Resolved: 2025-07-09

## 2025-07-09 PROBLEM — Z00.00 ROUTINE GENERAL MEDICAL EXAMINATION AT A HEALTH CARE FACILITY: Status: ACTIVE | Noted: 2025-07-09

## 2025-07-09 ASSESSMENT — ENCOUNTER SYMPTOMS
APNEA: 0
UNEXPECTED WEIGHT CHANGE: 1
PALPITATIONS: 0
NERVOUS/ANXIOUS: 1
POLYDIPSIA: 0
ACTIVITY CHANGE: 1
POLYPHAGIA: 0
DYSPHORIC MOOD: 1
SLEEP DISTURBANCE: 0

## 2025-08-14 LAB
25(OH)D3+25(OH)D2 SERPL-MCNC: 29 NG/ML (ref 30–100)
ALBUMIN SERPL-MCNC: 4.2 G/DL (ref 3.6–5.1)
ALP SERPL-CCNC: 48 U/L (ref 31–125)
ALT SERPL-CCNC: 8 U/L (ref 6–29)
ANION GAP SERPL CALCULATED.4IONS-SCNC: 11 MMOL/L (CALC) (ref 7–17)
AST SERPL-CCNC: 12 U/L (ref 10–30)
BILIRUB SERPL-MCNC: 0.4 MG/DL (ref 0.2–1.2)
BUN SERPL-MCNC: 18 MG/DL (ref 7–25)
CALCIUM SERPL-MCNC: 9.2 MG/DL (ref 8.6–10.2)
CHLORIDE SERPL-SCNC: 106 MMOL/L (ref 98–110)
CHOLEST SERPL-MCNC: 237 MG/DL
CHOLEST/HDLC SERPL: 4.9 (CALC)
CO2 SERPL-SCNC: 24 MMOL/L (ref 20–32)
CREAT SERPL-MCNC: 0.81 MG/DL (ref 0.5–0.97)
EGFRCR SERPLBLD CKD-EPI 2021: 95 ML/MIN/1.73M2
ERYTHROCYTE [DISTWIDTH] IN BLOOD BY AUTOMATED COUNT: 13.8 % (ref 11–15)
EST. AVERAGE GLUCOSE BLD GHB EST-MCNC: 105 MG/DL
EST. AVERAGE GLUCOSE BLD GHB EST-SCNC: 5.8 MMOL/L
GLUCOSE SERPL-MCNC: 88 MG/DL (ref 65–99)
HBA1C MFR BLD: 5.3 %
HCT VFR BLD AUTO: 42.3 % (ref 35–45)
HDLC SERPL-MCNC: 48 MG/DL
HGB BLD-MCNC: 13.5 G/DL (ref 11.7–15.5)
LDLC SERPL CALC-MCNC: 168 MG/DL (CALC)
MCH RBC QN AUTO: 27.3 PG (ref 27–33)
MCHC RBC AUTO-ENTMCNC: 31.9 G/DL (ref 32–36)
MCV RBC AUTO: 85.5 FL (ref 80–100)
NONHDLC SERPL-MCNC: 189 MG/DL (CALC)
PLATELET # BLD AUTO: 358 THOUSAND/UL (ref 140–400)
PMV BLD REES-ECKER: 10.2 FL (ref 7.5–12.5)
POTASSIUM SERPL-SCNC: 4.3 MMOL/L (ref 3.5–5.3)
PROT SERPL-MCNC: 6.8 G/DL (ref 6.1–8.1)
RBC # BLD AUTO: 4.95 MILLION/UL (ref 3.8–5.1)
SODIUM SERPL-SCNC: 141 MMOL/L (ref 135–146)
TRIGL SERPL-MCNC: 97 MG/DL
TSH SERPL-ACNC: 1.06 MIU/L
WBC # BLD AUTO: 10.8 THOUSAND/UL (ref 3.8–10.8)